# Patient Record
Sex: FEMALE | Race: WHITE | NOT HISPANIC OR LATINO | Employment: OTHER | URBAN - METROPOLITAN AREA
[De-identification: names, ages, dates, MRNs, and addresses within clinical notes are randomized per-mention and may not be internally consistent; named-entity substitution may affect disease eponyms.]

---

## 2024-04-25 ENCOUNTER — OFFICE VISIT (OUTPATIENT)
Dept: URGENT CARE | Facility: CLINIC | Age: 69
End: 2024-04-25
Payer: COMMERCIAL

## 2024-04-25 VITALS
BODY MASS INDEX: 18.99 KG/M2 | HEART RATE: 72 BPM | DIASTOLIC BLOOD PRESSURE: 73 MMHG | HEIGHT: 67 IN | TEMPERATURE: 98 F | OXYGEN SATURATION: 96 % | WEIGHT: 121 LBS | SYSTOLIC BLOOD PRESSURE: 114 MMHG | RESPIRATION RATE: 20 BRPM

## 2024-04-25 DIAGNOSIS — S09.93XA FACIAL INJURY, INITIAL ENCOUNTER: ICD-10-CM

## 2024-04-25 DIAGNOSIS — S01.01XA SCALP LACERATION, INITIAL ENCOUNTER: ICD-10-CM
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
## 2024-04-25 DIAGNOSIS — S01.112A LACERATION OF LEFT EYEBROW, INITIAL ENCOUNTER: ICD-10-CM

## 2024-04-25 DIAGNOSIS — S09.90XA INJURY OF HEAD, INITIAL ENCOUNTER: ICD-10-CM

## 2024-04-25 DIAGNOSIS — S01.111A EYEBROW LACERATION, RIGHT, INITIAL ENCOUNTER: ICD-10-CM

## 2024-04-25 PROBLEM — M67.911 ROTATOR CUFF DISORDER, RIGHT: Status: ACTIVE | Noted: 2020-05-15

## 2024-04-25 PROBLEM — N18.30 CKD (CHRONIC KIDNEY DISEASE), STAGE III: Status: ACTIVE | Noted: 2017-04-11

## 2024-04-25 PROBLEM — F51.04 CHRONIC INSOMNIA: Status: ACTIVE | Noted: 2024-04-25

## 2024-04-25 PROBLEM — G50.0 TRIGEMINAL NEURALGIA: Status: ACTIVE | Noted: 2024-04-25

## 2024-04-25 PROBLEM — K58.1 IRRITABLE BOWEL SYNDROME WITH CONSTIPATION: Status: ACTIVE | Noted: 2023-09-12

## 2024-04-25 PROBLEM — N95.1 SYMPTOMATIC MENOPAUSAL OR FEMALE CLIMACTERIC STATES: Status: ACTIVE | Noted: 2024-04-25

## 2024-04-25 PROBLEM — M19.041 ARTHRITIS OF BOTH HANDS: Status: ACTIVE | Noted: 2022-01-13

## 2024-04-25 PROBLEM — R73.01 IMPAIRED FASTING GLUCOSE: Status: ACTIVE | Noted: 2018-09-04

## 2024-04-25 PROBLEM — M54.16 LUMBAR RADICULOPATHY: Status: ACTIVE | Noted: 2024-04-17

## 2024-04-25 PROBLEM — Z13.39 ATTENTION DEFICIT HYPERACTIVITY DISORDER (ADHD) EVALUATION: Status: ACTIVE | Noted: 2020-01-27

## 2024-04-25 PROBLEM — M75.111 NONTRAUMATIC INCOMPLETE TEAR OF RIGHT ROTATOR CUFF: Status: ACTIVE | Noted: 2022-07-20

## 2024-04-25 PROBLEM — D50.8 IRON DEFICIENCY ANEMIA SECONDARY TO INADEQUATE DIETARY IRON INTAKE: Status: ACTIVE | Noted: 2023-09-12

## 2024-04-25 PROBLEM — M19.042 ARTHRITIS OF BOTH HANDS: Status: ACTIVE | Noted: 2022-01-13

## 2024-04-25 PROBLEM — M51.9 LUMBAR DISC DISEASE: Status: ACTIVE | Noted: 2017-04-11

## 2024-04-25 PROCEDURE — 99204 OFFICE O/P NEW MOD 45 MIN: CPT | Mod: 25,S$GLB,, | Performed by: NURSE PRACTITIONER

## 2024-04-25 PROCEDURE — 12032 INTMD RPR S/A/T/EXT 2.6-7.5: CPT | Mod: S$GLB,,, | Performed by: NURSE PRACTITIONER

## 2024-04-25 RX ORDER — MUPIROCIN 20 MG/G
OINTMENT TOPICAL 3 TIMES DAILY PRN
Qty: 22 G | Refills: 0 | Status: SHIPPED | OUTPATIENT
Start: 2024-04-25

## 2024-04-25 RX ORDER — ERGOCALCIFEROL 1.25 MG/1
CAPSULE ORAL
COMMUNITY

## 2024-04-25 RX ORDER — AZELASTINE HYDROCHLORIDE, FLUTICASONE PROPIONATE 137; 50 UG/1; UG/1
1 SPRAY, METERED NASAL 2 TIMES DAILY
COMMUNITY
Start: 2023-12-09

## 2024-04-25 RX ORDER — PROPYLENE GLYCOL 0.06 MG/ML
EMULSION OPHTHALMIC
COMMUNITY

## 2024-04-25 RX ORDER — HYDROCODONE BITARTRATE AND ACETAMINOPHEN 10; 325 MG/1; MG/1
1 TABLET ORAL EVERY 6 HOURS PRN
COMMUNITY
Start: 2024-04-20

## 2024-04-25 RX ORDER — SEMAGLUTIDE 0.68 MG/ML
0.5 INJECTION, SOLUTION SUBCUTANEOUS
COMMUNITY
Start: 2024-01-30

## 2024-04-25 NOTE — PATIENT INSTRUCTIONS
Laceration Home Care Instructions    Keep your dressing on for 24 hours. Do not wet laceration for 12 hours.  After 24 hours remove the dressing and gently clean wound with soap and water at least twice daily unless more frequently soiled, then clean more frequently.    May apply topical antibiotic (avoid neosporin) to wound to promote healing.   Clean old antibiotic ointment away before new application.    DO NOT REMOVE SUTURES YOURSELF.    PLEASE RETURN HERE OR ANOTHER [facility] IN 7-10 DAYS FOR SUTURE OR STAPLE REMOVAL  Signs of infection include:  Increase in redness, increase in pain, purulent (pus) drainage. Contact clinic if infection concerns arise.   Do not apply a great deal of tension or stress to the suture line.  Keep covered when you are out and about, if the dressing becomes wet, change it immediately. Keep open to air when at home.     A laceration is a cut through the skin. You have a laceration that has been closed with skin glue. This is used on cuts that have smooth edges and are not infected.   You may need a tetanus shot. This is given if you have no record of a shot, and the object that caused the cut may lead to tetanus.  Home Care  Your healthcare provider may prescribe an antibiotic. This is to help prevent infection. Follow all instructions for taking this medicine. Take the medicine every day until it is gone or you are told to stop. You should not have any left over.  The healthcare provider may prescribe medicines for pain. Follow instructions for taking them.  Follow the healthcare provider's instructions on how to care for the cut.  No bandage is needed. Skin glue peels off on its own within 5 to 10 days. Most skin wounds heal within 10 days.  Keep the wound clean and dry. You may shower or bathe as usual, but do not use soaps, lotions, or ointments on the wound area. Do not scrub the wound. After bathing, pat the wound dry with a soft towel.  Do not scratch, rub,  or pick at the film. Do not place tape directly over the film.  Do not apply liquids (such as peroxide), ointments, or creams to the wound while the skin glue is in place.  Avoid activities that may reinjure your wound.  Avoid activities that cause heavy sweating. Protect the wound from sunlight.  Most skin wounds heal without problems. However, an infection sometimes occurs despite proper treatment. Therefore, watch for the signs of infection listed below.  Follow-up care  Follow up as directed with your healthcare provider or our staff.  When to seek medical advice  Call your health care provider right away if any of these occur:  Wound bleeding not controlled by direct pressure  Signs of infection, including increasing pain in the wound, increasing wound redness or swelling, or pus or bad odor coming from the wound  Fever of 100.4°F (38.ºC) or higher or as directed by your healthcare provider  Wound edges re-open  Wound changes colors  Numbness around the wound   Decreased movement around the injured area

## 2024-04-25 NOTE — PROGRESS NOTES
"Subjective:      Patient ID: Radha Vital is a 68 y.o. female.    Vitals:  height is 5' 7" (1.702 m) and weight is 54.9 kg (121 lb). Her oral temperature is 97.9 °F (36.6 °C). Her blood pressure is 114/73 and her pulse is 72. Her respiration is 20 and oxygen saturation is 96%.     Chief Complaint: Laceration    Patient got up to go use the restroom and fell at 3:30am this morning. Laceration is located on bilateral eyebrows and back of scalp.   Patient denies having any dizziness, headaches, blurred vision, or pain.  Patient is on baby aspirin.    68 year old female presents to clinic with complaints of fall approximately 8 hours prior to clinic arrival hitting her face and back of her head. Reports treating with aspirin. Last tdap 10/7/23    Laceration   The incident occurred 6 to 12 hours ago. The laceration is located on the Scalp, left eye and right eye. The laceration is 2 cm in size. The pain is at a severity of 0/10. The patient is experiencing no pain. The pain has been Improving since onset. She reports no foreign bodies present. Her tetanus status is UTD.     Musculoskeletal:  Positive for trauma.   Skin:  Positive for laceration and bruising. Negative for erythema.      Objective:     Physical Exam   Constitutional: She is oriented to person, place, and time. She appears well-developed.   HENT:   Head: Normocephalic. Head is with laceration. Head is without abrasion and without contusion.       Ears:   Right Ear: External ear normal.   Left Ear: External ear normal.   Nose: Nose normal.   Mouth/Throat: Oropharynx is clear and moist and mucous membranes are normal.   Eyes: Conjunctivae, EOM and lids are normal. Pupils are equal, round, and reactive to light. Extraocular movement intact   Neck: Trachea normal and phonation normal. Neck supple.   Cardiovascular: Normal rate.   Pulmonary/Chest: Effort normal.   Musculoskeletal: Normal range of motion.         General: Normal range of motion.   Neurological: " She is alert and oriented to person, place, and time.   Skin: Skin is warm, dry, intact and no rash. Capillary refill takes less than 2 seconds. No abrasion, No burn, No bruising, No erythema and No ecchymosis   Psychiatric: Her speech is normal and behavior is normal. Judgment and thought content normal.   Nursing note and vitals reviewed.                    Assessment:     1. Fall, initial encounter    2. Injury of head, initial encounter    3. Facial injury, initial encounter    4. Scalp laceration, initial encounter    5. Laceration of left eyebrow, initial encounter    6. Eyebrow laceration, right, initial encounter        Plan:       Fall, initial encounter    Injury of head, initial encounter    Facial injury, initial encounter    Scalp laceration, initial encounter  -     mupirocin (BACTROBAN) 2 % ointment; Apply topically 3 (three) times daily as needed.  Dispense: 22 g; Refill: 0  -     Laceration Repair    Laceration of left eyebrow, initial encounter    Eyebrow laceration, right, initial encounter      Laceration Repair    Date/Time: 4/25/2024 10:20 AM    Performed by: Cindy Capps NP  Authorized by: Cindy Capps NP  Consent Done: Emergent Situation  Body area: head/neck  Location details: scalp  Laceration length: 3 cm  Foreign bodies: no foreign bodies  Tendon involvement: none  Nerve involvement: none  Vascular damage: no  Anesthesia method: none.    Patient sedated: no  Irrigation solution: saline  Irrigation method: syringe  Amount of cleaning: extensive  Debridement: none  Degree of undermining: none  Skin closure: staples and glue  Number of sutures: 7  Technique: simple  Approximation: close  Approximation difficulty: simple  Patient tolerance: Patient tolerated the procedure well with no immediate complications  Comments: Patient will provide ointment and care to site upon arrival at home       Glue applied to bilateral eyebrow lacerations    Patient Instructions   Laceration Home Care  Instructions    Keep your dressing on for 24 hours. Do not wet laceration for 12 hours.  After 24 hours remove the dressing and gently clean wound with soap and water at least twice daily unless more frequently soiled, then clean more frequently.    May apply topical antibiotic (avoid neosporin) to wound to promote healing.   Clean old antibiotic ointment away before new application.    DO NOT REMOVE SUTURES YOURSELF.    PLEASE RETURN HERE OR ANOTHER [facility] IN 7-10 DAYS FOR SUTURE OR STAPLE REMOVAL  Signs of infection include:  Increase in redness, increase in pain, purulent (pus) drainage. Contact clinic if infection concerns arise.   Do not apply a great deal of tension or stress to the suture line.  Keep covered when you are out and about, if the dressing becomes wet, change it immediately. Keep open to air when at home.     A laceration is a cut through the skin. You have a laceration that has been closed with skin glue. This is used on cuts that have smooth edges and are not infected.   You may need a tetanus shot. This is given if you have no record of a shot, and the object that caused the cut may lead to tetanus.  Home Care  Your healthcare provider may prescribe an antibiotic. This is to help prevent infection. Follow all instructions for taking this medicine. Take the medicine every day until it is gone or you are told to stop. You should not have any left over.  The healthcare provider may prescribe medicines for pain. Follow instructions for taking them.  Follow the healthcare provider's instructions on how to care for the cut.  No bandage is needed. Skin glue peels off on its own within 5 to 10 days. Most skin wounds heal within 10 days.  Keep the wound clean and dry. You may shower or bathe as usual, but do not use soaps, lotions, or ointments on the wound area. Do not scrub the wound. After bathing, pat the wound dry with a soft towel.  Do not scratch, rub, or pick at the film.  Do not place tape directly over the film.  Do not apply liquids (such as peroxide), ointments, or creams to the wound while the skin glue is in place.  Avoid activities that may reinjure your wound.  Avoid activities that cause heavy sweating. Protect the wound from sunlight.  Most skin wounds heal without problems. However, an infection sometimes occurs despite proper treatment. Therefore, watch for the signs of infection listed below.  Follow-up care  Follow up as directed with your healthcare provider or our staff.  When to seek medical advice  Call your health care provider right away if any of these occur:  Wound bleeding not controlled by direct pressure  Signs of infection, including increasing pain in the wound, increasing wound redness or swelling, or pus or bad odor coming from the wound  Fever of 100.4°F (38.ºC) or higher or as directed by your healthcare provider  Wound edges re-open  Wound changes colors  Numbness around the wound   Decreased movement around the injured area

## 2024-04-25 NOTE — PROCEDURES
Laceration Repair    Date/Time: 4/25/2024 10:20 AM    Performed by: Cindy Capps NP  Authorized by: Cindy Capps NP  Consent Done: Emergent Situation  Body area: head/neck  Location details: scalp  Laceration length: 3 cm  Foreign bodies: no foreign bodies  Tendon involvement: none  Nerve involvement: none  Vascular damage: no  Anesthesia method: none.    Patient sedated: no  Irrigation solution: saline  Irrigation method: syringe  Amount of cleaning: extensive  Debridement: none  Degree of undermining: none  Skin closure: staples and glue  Number of sutures: 7  Technique: simple  Approximation: close  Approximation difficulty: simple  Patient tolerance: Patient tolerated the procedure well with no immediate complications  Comments: Patient will provide ointment and care to site upon arrival at home

## 2024-08-28 ENCOUNTER — OFFICE VISIT (OUTPATIENT)
Dept: ORTHOPEDICS | Facility: CLINIC | Age: 69
End: 2024-08-28
Payer: MEDICARE

## 2024-08-28 VITALS — HEIGHT: 67 IN | BODY MASS INDEX: 18.52 KG/M2 | WEIGHT: 118 LBS

## 2024-08-28 DIAGNOSIS — M15.2 DEGENERATIVE ARTHRITIS OF PROXIMAL INTERPHALANGEAL JOINT OF RING FINGER OF LEFT HAND: Primary | ICD-10-CM

## 2024-08-28 DIAGNOSIS — M18.12 ARTHRITIS OF CARPOMETACARPAL (CMC) JOINT OF LEFT THUMB: ICD-10-CM

## 2024-08-28 DIAGNOSIS — M15.1 DEGENERATIVE ARTHRITIS OF DISTAL INTERPHALANGEAL JOINT OF LITTLE FINGER OF RIGHT HAND: ICD-10-CM

## 2024-08-28 PROCEDURE — 99999 PR PBB SHADOW E&M-EST. PATIENT-LVL III: CPT | Mod: PBBFAC,,, | Performed by: ORTHOPAEDIC SURGERY

## 2024-08-28 PROCEDURE — 20600 DRAIN/INJ JOINT/BURSA W/O US: CPT | Mod: PBBFAC,LT | Performed by: ORTHOPAEDIC SURGERY

## 2024-08-28 PROCEDURE — 99213 OFFICE O/P EST LOW 20 MIN: CPT | Mod: PBBFAC | Performed by: ORTHOPAEDIC SURGERY

## 2024-08-28 PROCEDURE — 99999PBSHW PR PBB SHADOW TECHNICAL ONLY FILED TO HB: Mod: PBBFAC,,,

## 2024-08-28 RX ORDER — BETAMETHASONE SODIUM PHOSPHATE AND BETAMETHASONE ACETATE 3; 3 MG/ML; MG/ML
3 INJECTION, SUSPENSION INTRA-ARTICULAR; INTRALESIONAL; INTRAMUSCULAR; SOFT TISSUE
Status: DISCONTINUED | OUTPATIENT
Start: 2024-08-28 | End: 2024-08-28 | Stop reason: HOSPADM

## 2024-08-28 RX ADMIN — BETAMETHASONE ACETATE AND BETAMETHASONE SODIUM PHOSPHATE 3 MG: 3; 3 INJECTION, SUSPENSION INTRA-ARTICULAR; INTRALESIONAL; INTRAMUSCULAR; SOFT TISSUE at 11:08

## 2024-08-28 NOTE — PROGRESS NOTES
EVA Isbell M.D.  Orthopaedic Hand and Wrist Surgery  [facility]    Patient ID: Radha Vital  YOB: 1955  MRN: [id number]      History of Present Illness: Radha Vital is a 68 y.o. female with longstanding left thumb CMC arthritis, left ring finger PIP joint arthritis, and right small finger PIP joint arthritis she tries injections around every 3 months she is here today for another set of injections    Patient was queried and this is the extent of the patients current complaints today.    Physical Exam:   Wound:  None  Positive thumb CMC grind  Negative Finkelstein's on the left  Tenderness over the left ring finger PIP joint  No tenderness of left ring finger A1 pulley    Coronal deformity of the right small finger PIP joint with evidence of arthritis    Imaging:  No new imaging today    Provider Note/Medical Decision Makin. 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   Symptoms may resolve, worsen and even become permanent.  The patient understands the treatment options including observation, activity modification, therapy, NSAIDs, splints, injections and the surgical options including PIP arthroplasty.  We discussed the risks of the diagnosis and the treatment options including pain, infection, bleeding, damage to nerves and vessels, stiffness, scarring, incomplete relief or recurrence of symptoms, poor pain and functional outcomes.  Unique risks of this diagnosis and the treatment include persistent pain and failure.  The patient has elected to proceed with steroid injection and we will follow up as needed.      Orders:  -     Small Joint Aspiration/Injection: L ring PIP  -      betamethasone acetate-betamethasone sodium phosphate injection 3 mg    2. Arthritis of carpometacarpal (CMC) joint of left thumb  Assessment & Plan:  The patient and I talked at length about the natural history and pathophysiology of left thumb CMC arthritis, she understands that this is a chronic problem which may have acute episodic exacerbations.   Symptoms may resolve, worsen and even become permanent.  The patient understands the treatment options including observation, activity modification, therapy, NSAIDs, splints, injections and the surgical options including thumb CMC arthroplasty.  We discussed the risks of the diagnosis and the treatment options including pain, infection, bleeding, damage to nerves and vessels, stiffness, scarring, incomplete relief or recurrence of symptoms, poor pain and functional outcomes.  Unique risks of this diagnosis and the treatment include persistent pain and soreness.    The patient has elected to proceed with left thumb CMC injection and we will follow up as needed.      Orders:  -     Small Joint Aspiration/Injection: L thumb CMC  -     betamethasone acetate-betamethasone sodium phosphate injection 3 mg    3. 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   Symptoms may resolve, worsen and even become permanent.  The patient understands the treatment options including observation, activity modification, therapy, NSAIDs, splints, injections and the surgical options including arthrodesis.  We discussed the risks of the diagnosis and the treatment options including pain, infection, bleeding, damage to nerves and vessels, stiffness, scarring, incomplete relief or recurrence of  symptoms, poor pain and functional outcomes.  Unique risks of this diagnosis and the treatment include nonunion malunion hardware irritation.  The patient has elected to proceed with observation and we will follow up as needed.             I discussed worrisome and red flag signs and symptoms with the patient. The patient expressed understanding and agreed to alert me immediately or to go to the emergency room if they experience any of these.   Treatment plan was developed with input from the patient/family, and they expressed understanding and agreement with the plan. All questions were answered today.    There are no Patient Instructions on file for this visit.    EVA Isbell M.D.  [facility]  Orthopedic Hand and Wrist Surgeon    [facility]  Dr. Phill Isbell   [employer] Review   [employer]   [employer]     Disclaimer: This note was prepared using a voice recognition system and is likely to have sound alike errors within the text.

## 2024-08-28 NOTE — ASSESSMENT & PLAN NOTE
The patient and I talked at length about the natural history and pathophysiology of distal interphalangeal joint arthritis of the right small finger, she understands that this is a chronic problem which may have acute episodic exacerbations.   Symptoms may resolve, worsen and even become permanent.  The patient understands the treatment options including observation, activity modification, therapy, NSAIDs, splints, injections and the surgical options including arthrodesis.  We discussed the risks of the diagnosis and the treatment options including pain, infection, bleeding, damage to nerves and vessels, stiffness, scarring, incomplete relief or recurrence of symptoms, poor pain and functional outcomes.  Unique risks of this diagnosis and the treatment include nonunion malunion hardware irritation.  The patient has elected to proceed with observation and we will follow up as needed.

## 2024-08-28 NOTE — ASSESSMENT & PLAN NOTE
The patient and I talked at length about the natural history and pathophysiology of proximal interphalangeal joint arthritis, she understands that this is a chronic problem which may have acute episodic exacerbations.   Symptoms may resolve, worsen and even become permanent.  The patient understands the treatment options including observation, activity modification, therapy, NSAIDs, splints, injections and the surgical options including PIP arthroplasty.  We discussed the risks of the diagnosis and the treatment options including pain, infection, bleeding, damage to nerves and vessels, stiffness, scarring, incomplete relief or recurrence of symptoms, poor pain and functional outcomes.  Unique risks of this diagnosis and the treatment include persistent pain and failure.  The patient has elected to proceed with steroid injection and we will follow up as needed.

## 2024-08-28 NOTE — PROCEDURES
Small Joint Aspiration/Injection: L ring PIP    Date/Time: 8/28/2024 11:00 AM    Performed by: Siddharth Isbell MD  Authorized by: Siddharth Isbell MD    Consent Done?:  Yes (Verbal)  Indications:  Arthritis  Site marked: the procedure site was marked    Timeout: prior to procedure the correct patient, procedure, and site was verified    Prep: patient was prepped and draped in usual sterile fashion      Local anesthesia used?: Yes    Local anesthetic:  Lidocaine 1% without epinephrine  Anesthetic total (ml):  0.5    Location:  Ring finger  Site:  L ring PIP  Needle size:  27 G  Approach:  Dorsal  Medications:  3 mg betamethasone acetate-betamethasone sodium phosphate 6 mg/mL  Patient tolerance:  Patient tolerated the procedure well with no immediate complications

## 2024-08-28 NOTE — PROCEDURES
Small Joint Aspiration/Injection: L thumb CMC    Date/Time: 8/28/2024 11:00 AM    Performed by: Siddharth Isbell MD  Authorized by: Siddharth Isbell MD    Consent Done?:  Yes (Verbal)  Indications:  Arthritis  Site marked: the procedure site was marked    Timeout: prior to procedure the correct patient, procedure, and site was verified    Prep: patient was prepped and draped in usual sterile fashion      Local anesthetic:  Lidocaine 1% without epinephrine  Anesthetic total (ml):  0.5    Location:  Thumb  Site:  L thumb CMC  Needle size:  27 G  Medications:  3 mg betamethasone acetate-betamethasone sodium phosphate 6 mg/mL  Patient tolerance:  Patient tolerated the procedure well with no immediate complications     ROS obtained from son

## 2024-08-28 NOTE — ASSESSMENT & PLAN NOTE
The patient and I talked at length about the natural history and pathophysiology of left thumb CMC arthritis, she understands that this is a chronic problem which may have acute episodic exacerbations.   Symptoms may resolve, worsen and even become permanent.  The patient understands the treatment options including observation, activity modification, therapy, NSAIDs, splints, injections and the surgical options including thumb CMC arthroplasty.  We discussed the risks of the diagnosis and the treatment options including pain, infection, bleeding, damage to nerves and vessels, stiffness, scarring, incomplete relief or recurrence of symptoms, poor pain and functional outcomes.  Unique risks of this diagnosis and the treatment include persistent pain and soreness.    The patient has elected to proceed with left thumb CMC injection and we will follow up as needed.

## 2025-01-29 ENCOUNTER — OFFICE VISIT (OUTPATIENT)
Dept: ORTHOPEDICS | Facility: CLINIC | Age: 70
End: 2025-01-29
Payer: MEDICARE

## 2025-01-29 DIAGNOSIS — M18.12 ARTHRITIS OF CARPOMETACARPAL (CMC) JOINT OF LEFT THUMB: Primary | ICD-10-CM

## 2025-01-29 DIAGNOSIS — M15.2 DEGENERATIVE ARTHRITIS OF PROXIMAL INTERPHALANGEAL JOINT OF RING FINGER OF LEFT HAND: ICD-10-CM

## 2025-01-29 DIAGNOSIS — M15.1 DEGENERATIVE ARTHRITIS OF DISTAL INTERPHALANGEAL JOINT OF LITTLE FINGER OF RIGHT HAND: ICD-10-CM

## 2025-01-29 PROCEDURE — 99211 OFF/OP EST MAY X REQ PHY/QHP: CPT | Mod: PBBFAC | Performed by: ORTHOPAEDIC SURGERY

## 2025-01-29 PROCEDURE — 99999PBSHW PR PBB SHADOW TECHNICAL ONLY FILED TO HB: Mod: PBBFAC,,,

## 2025-01-29 PROCEDURE — 99999 PR PBB SHADOW E&M-EST. PATIENT-LVL I: CPT | Mod: PBBFAC,,, | Performed by: ORTHOPAEDIC SURGERY

## 2025-01-29 PROCEDURE — 20600 DRAIN/INJ JOINT/BURSA W/O US: CPT | Mod: PBBFAC,LT | Performed by: ORTHOPAEDIC SURGERY

## 2025-01-29 RX ORDER — BETAMETHASONE SODIUM PHOSPHATE AND BETAMETHASONE ACETATE 3; 3 MG/ML; MG/ML
3 INJECTION, SUSPENSION INTRA-ARTICULAR; INTRALESIONAL; INTRAMUSCULAR; SOFT TISSUE
Status: DISCONTINUED | OUTPATIENT
Start: 2025-01-29 | End: 2025-01-29 | Stop reason: HOSPADM

## 2025-01-29 RX ADMIN — BETAMETHASONE ACETATE AND BETAMETHASONE SODIUM PHOSPHATE 3 MG: 3; 3 INJECTION, SUSPENSION INTRA-ARTICULAR; INTRALESIONAL; INTRAMUSCULAR; SOFT TISSUE at 01:01

## 2025-01-29 NOTE — PROCEDURES
Small Joint Aspiration/Injection: L thumb CMC    Date/Time: 1/29/2025 1:45 PM    Performed by: Siddharth Isbell MD  Authorized by: Siddharth Isbell MD    Consent Done?:  Yes (Verbal)  Indications:  Arthritis  Site marked: the procedure site was marked    Timeout: prior to procedure the correct patient, procedure, and site was verified    Prep: patient was prepped and draped in usual sterile fashion      Local anesthetic:  Lidocaine 1% without epinephrine  Anesthetic total (ml):  0.5    Location:  Thumb  Site:  L thumb CMC  Medications:  3 mg betamethasone acetate-betamethasone sodium phosphate 6 mg/mL  Patient tolerance:  Patient tolerated the procedure well with no immediate complications    Additional Comments: I have explained the risks, benefits, and alternatives of the procedure in detail.  The patient voices understanding and all questions have been answered.  The patient agrees to proceed as planned. After sterile prep the left  thumb cmc joint was injected while being aware of the relevant neurovascular structures with 3mg celestone and 0.5mL of 1% lidocaine with a 27g needle. The patient tolerated the injection well. The patient understands that immediate relief of pain is secondary to the local anesthetic and will be temporary.  After the anesthetic wears off there may be a increase in pain that may last for a few hours or a few days and they should use ice to help alleviate this flair up of pain.

## 2025-01-29 NOTE — ASSESSMENT & PLAN NOTE
The patient and I talked at length about the natural history and pathophysiology of distal interphalangeal joint arthritis of the right small finger, she understands that this is a chronic problem which may have acute episodic exacerbations.   Symptoms may resolve, worsen and even become permanent.  The patient understands the treatment options including observation, activity modification, therapy, NSAIDs, splints, injections and the surgical options including arthrodesis.  We discussed the risks of the diagnosis and the treatment options including pain, infection, bleeding, damage to nerves and vessels, stiffness, scarring, incomplete relief or recurrence of symptoms, poor pain and functional outcomes.  Unique risks of this diagnosis and the treatment include nonunion malunion hardware irritation.  The patient has elected to proceed with right small finger distal interphalangeal joint arthrodesis.  She will call us to schedule

## 2025-01-29 NOTE — PROGRESS NOTES
EVA Isbell M.D.  Orthopaedic Hand and Wrist Surgery  [facility]    Patient ID: Radha Vital  YOB: 1955  MRN: [id number]    Provider Note/Medical Decision Makin. Arthritis of carpometacarpal (CMC) joint of left thumb  Assessment & Plan:  The patient and I talked at length about the natural history and pathophysiology of left thumb CMC arthritis, she understands that this is a chronic problem which may have acute episodic exacerbations.   Symptoms may resolve, worsen and even become permanent.  The patient understands the treatment options including observation, activity modification, therapy, NSAIDs, splints, injections and the surgical options including thumb CMC arthroplasty.  We discussed the risks of the diagnosis and the treatment options including pain, infection, bleeding, damage to nerves and vessels, stiffness, scarring, incomplete relief or recurrence of symptoms, poor pain and functional outcomes.  Unique risks of this diagnosis and the treatment include persistent pain and soreness.    The patient has elected to proceed with left thumb CMC injection and we will follow up as needed.      Orders:  -     Small Joint Aspiration/Injection: L thumb CMC    2. 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   Symptoms may resolve, worsen and even become permanent.  The patient understands the treatment options including observation, activity modification, therapy, NSAIDs, splints, injections and the surgical options including PIP arthroplasty.  We discussed the risks of the diagnosis and the treatment options including pain, infection, bleeding, damage to nerves and vessels, stiffness,  scarring, incomplete relief or recurrence of symptoms, poor pain and functional outcomes.  Unique risks of this diagnosis and the treatment include persistent pain and failure.  The patient has elected to proceed with steroid injection and we will follow up as needed.      Orders:  -     Small Joint Aspiration/Injection: L ring PIP    3. 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   Symptoms may resolve, worsen and even become permanent.  The patient understands the treatment options including observation, activity modification, therapy, NSAIDs, splints, injections and the surgical options including arthrodesis.  We discussed the risks of the diagnosis and the treatment options including pain, infection, bleeding, damage to nerves and vessels, stiffness, scarring, incomplete relief or recurrence of symptoms, poor pain and functional outcomes.  Unique risks of this diagnosis and the treatment include nonunion malunion hardware irritation.  The patient has elected to proceed with right small finger distal interphalangeal joint arthrodesis.  She will call us to schedule           Chief Complaint: Follow-up of the Left Hand      Referred By: Self,Aaareferral    History of Present Illness: Radha Vital is a 69 y.o. female who is here 5 months status post left CMC and left ring finger PIP corticosteroid injection for known degenerative joint arthritis. She endorses complete relief following the corticosteroid injection for 3 months and returns today with pain consistent with joint arthritis. She also would like to discuss her right small finger DIP joint pain today as well.     Patient was queried and this is the extent  "of the patients current complaints today.    Past Medical History:     Estimated body mass index is 18.45 kg/m² as calculated from the following:    Height as of 11/18/24: 5' 7" (1.702 m).    Weight as of 11/18/24: 53.4 kg (117 lb 12.8 oz).  Past Medical History:   Diagnosis Date    Disc disorder     Dry eye     Hyperlipidemia     Hypertension     Spinal stenosis      Past Surgical History:   Procedure Laterality Date    BACK SURGERY      HYSTERECTOMY  1987    endometriosis    LASIK      OOPHORECTOMY      OTHER SURGICAL HISTORY      ablation of trigeminal nerve right side    ROTATOR CUFF REPAIR      TONSILLECTOMY AND ADENOIDECTOMY      TOTAL REDUCTION MAMMOPLASTY       Family History   Problem Relation Name Age of Onset    Hypertension Paternal Grandfather      Hypertension Paternal Grandmother      Diabetes Maternal Grandmother      Breast cancer Paternal Aunt       Social History     Socioeconomic History    Marital status:    Tobacco Use    Smoking status: Never     Passive exposure: Past    Smokeless tobacco: Never     Social Drivers of Health     Financial Resource Strain: Low Risk  (6/26/2024)    [facility]    Overall Financial Resource Strain (CARDIA)     Difficulty of Paying Living Expenses: Not hard at all   Food Insecurity: Unknown (6/26/2024)    Received from [facility] and [facility]ates    Hunger Vital Sign     Worried About Running Out of Food in the Last Year: Never true   Transportation Needs: No Transportation Needs (6/26/2024)    [facility]    PRAPARE - Transportation     Lack of Transportation (Medical): No     Lack of Transportation (Non-Medical): No   Physical Activity: Sufficiently Active (6/26/2024)    [facility] Its Subsidiaries and Affiliates    Exercise Vital Sign     Days of Exercise per Week: 6 days     Minutes of Exercise per Session: 50 min   Stress: No Stress Concern Present (6/26/2024)    [facility] Its Sub[facility]idi[facility] and Affiliates    Citizen of [place] of Occupational Health - Occupational Stress Questionnaire     Feeling of Stress : Not at all     Medication List with Changes/Refills   Current Medications    ASPIRIN (ECOTRIN) 81 MG EC TABLET    Take 81 mg by mouth.    ATORVASTATIN (LIPITOR) 80 MG TABLET    Take 80 mg by mouth nightly.    AZELASTINE-FLUTICASONE (DYMISTA) 137-50 MCG/SPRAY SPRY NASSAL SPRAY    1 spray by Each Nostril route 2 (two) times daily.    CALCIUM CARBONATE (TUMS) 300 MG (750 MG) CHEW    Take by mouth.    CYCLOSPORINE (RESTASIS) 0.05 % OPHTHALMIC EMULSION    PLACE 1 DROP INTO BOTH EYES TWICE A DAY    DULOXETINE (CYMBALTA) 60 MG CAPSULE    Take 1 capsule by mouth once daily.    ERGOCALCIFEROL (ERGOCALCIFEROL) 50,000 UNIT CAP    Take by mouth.    ESTRADIOL (ESTRACE) 0.01 % (0.1 MG/GRAM) VAGINAL CREAM    Insert 1 gm vaginally 2 times per week. Apply pea size amount with finger externally daily if needed    ESTROGENS,ESTERIFIED,-METHYLTESTOSTERONE 0.625-1.25MG (ESTRATEST HS) PER TABLET    Take 1 tablet by mouth once daily.    HYDROCODONE-ACETAMINOPHEN (NORCO)  MG PER TABLET    Take 1 tablet by mouth every 6 (six) hours as needed.    PANTOPRAZOLE (PROTONIX) 40 MG TABLET    Take 1 tablet by mouth once daily.    PROPYLENE GLYCOL (SYSTANE BALANCE) 0.6 % DROP    Apply to eye.    SEMAGLUTIDE (OZEMPIC) 0.25 MG OR 0.5 MG (2 MG/3 ML) PEN INJECTOR    Inject 0.5 mg into the skin.    TOPIRAMATE (TOPAMAX) 100 MG TABLET    TAKE 1.5 TABLETS BY MOUTH 2 TIMES DAILY.    TRAZODONE (DESYREL) 150 MG TABLET        VUITY 1.25 % DROP    INSTILL 1 DROP IN BOTH EYES EVERY DAY     Review of patient's allergies indicates:   Allergen Reactions     Iodine and iodide containing products Shortness Of Breath    Fentanyl Itching    Latex, natural rubber Rash    Pcn [penicillins] Rash    Sulfa (sulfonamide antibiotics) Rash     ROS    Physical Exam:   GENERAL: Well appearing, appropriate for stated age, no acute distress.  CARDIOVASCULAR:  Fingers have good brisk refill and good turgor.   PULMONARY: Respirations are even and non-labored.  NEURO: Awake, alert, and oriented x 3.  PSYCH: Mood & affect are appropriate.  Ortho/SPM Exam  Hand/Wrist Musculoskeletal Exam  Positive left thumb CMC grind  Positive shoulder sign of the left  Tenderness of the left ring finger PIP joint  Range of motion of the left ring finger PIP joint is from 0-100 degrees    Right hand  Right small finger distal interphalangeal joint arthritis with coronal deformity consistent with arthritis  Range of motion was from 0-60 degrees  No signs of infection            Provider Note/Medical Decision Makin. Arthritis of carpometacarpal (CMC) joint of left thumb  Assessment & Plan:  The patient and I talked at length about the natural history and pathophysiology of left thumb CMC arthritis, she understands that this is a chronic problem which may have acute episodic exacerbations.   Symptoms may resolve, worsen and even become permanent.  The patient understands the treatment options including observation, activity modification, therapy, NSAIDs, splints, injections and the surgical options including thumb CMC arthroplasty.  We discussed the risks of the diagnosis and the treatment options including pain, infection, bleeding, damage to nerves and vessels, stiffness, scarring, incomplete relief or recurrence of symptoms, poor pain and functional outcomes.  Unique risks of this diagnosis and the treatment include persistent pain and soreness.    The patient has elected to proceed with left thumb CMC injection and we will follow up as needed.      Orders:  -     Small Joint Aspiration/Injection: L  thumb CMC    2. 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   Symptoms may resolve, worsen and even become permanent.  The patient understands the treatment options including observation, activity modification, therapy, NSAIDs, splints, injections and the surgical options including PIP arthroplasty.  We discussed the risks of the diagnosis and the treatment options including pain, infection, bleeding, damage to nerves and vessels, stiffness, scarring, incomplete relief or recurrence of symptoms, poor pain and functional outcomes.  Unique risks of this diagnosis and the treatment include persistent pain and failure.  The patient has elected to proceed with steroid injection and we will follow up as needed.      Orders:  -     Small Joint Aspiration/Injection: L ring PIP    3. 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   Symptoms may resolve, worsen and even become permanent.  The patient understands the treatment options including observation, activity modification, therapy, NSAIDs, splints, injections and the surgical options including arthrodesis.  We discussed the risks of the diagnosis and the treatment options including pain, infection, bleeding, damage to nerves and vessels, stiffness, scarring, incomplete relief or recurrence of symptoms, poor pain and functional outcomes.  Unique risks of this diagnosis and the treatment include  nonunion malunion hardware irritation.  The patient has elected to proceed with right small finger distal interphalangeal joint arthrodesis.  She will call us to schedule           I discussed worrisome and red flag signs and symptoms with the patient. The patient expressed understanding and agreed to alert me immediately or to go to the emergency room if they experience any of these.   Treatment plan was developed with input from the patient/family, and they expressed understanding and agreement with the plan. All questions were answered today.    There are no Patient Instructions on file for this visit.    EVA Isbell M.D.  [facility] of Orthopedic Surgery  Orthopedic Hand and Wrist Surgeon    [facility]  Dr. Phill Isbell   [email or web address]   [employer]     Disclaimer: This note was prepared using a voice recognition system and is likely to have sound alike errors within the text.

## 2025-01-29 NOTE — PROCEDURES
Small Joint Aspiration/Injection: L ring PIP    Date/Time: 1/29/2025 1:45 PM    Performed by: Siddharth Isbell MD  Authorized by: Siddharth Isbell MD    Consent Done?:  Yes (Verbal)  Indications:  Arthritis  Site marked: the procedure site was marked    Timeout: prior to procedure the correct patient, procedure, and site was verified    Prep: patient was prepped and draped in usual sterile fashion      Local anesthesia used?: Yes    Local anesthetic:  Lidocaine 1% without epinephrine  Anesthetic total (ml):  0.5    Location:  Ring finger  Site:  L ring PIP  Needle size:  27 G  Approach:  Dorsal  Medications:  3 mg betamethasone acetate-betamethasone sodium phosphate 6 mg/mL  Patient tolerance:  Patient tolerated the procedure well with no immediate complications    Additional Comments: I have explained the risks, benefits, and alternatives of the procedure in detail.  The patient voices understanding and all questions have been answered.  The patient agrees to proceed as planned. After sterile prep the left  Ring finger PIP joint dorsal aspect was injected while being aware of the relevant neurovascular structures with 3mg celestone and 0.5mL of 1% lidocaine with a 27g needle. The patient tolerated the injection well. The patient understands that immediate relief of pain is secondary to the local anesthetic and will be temporary.  After the anesthetic wears off there may be a increase in pain that may last for a few hours or a few days and they should use ice to help alleviate this flair up of pain.

## 2025-08-07 ENCOUNTER — PATIENT MESSAGE (OUTPATIENT)
Dept: RESEARCH | Facility: HOSPITAL | Age: 70
End: 2025-08-07
Payer: MEDICARE